# Patient Record
Sex: MALE | Race: BLACK OR AFRICAN AMERICAN | URBAN - METROPOLITAN AREA
[De-identification: names, ages, dates, MRNs, and addresses within clinical notes are randomized per-mention and may not be internally consistent; named-entity substitution may affect disease eponyms.]

---

## 2022-05-11 ENCOUNTER — OFFICE VISIT (OUTPATIENT)
Dept: URBAN - METROPOLITAN AREA CLINIC 92 | Facility: CLINIC | Age: 32
End: 2022-05-11
Payer: COMMERCIAL

## 2022-05-11 ENCOUNTER — DASHBOARD ENCOUNTERS (OUTPATIENT)
Age: 32
End: 2022-05-11

## 2022-05-11 ENCOUNTER — WEB ENCOUNTER (OUTPATIENT)
Dept: URBAN - METROPOLITAN AREA CLINIC 92 | Facility: CLINIC | Age: 32
End: 2022-05-11

## 2022-05-11 VITALS
HEIGHT: 70 IN | TEMPERATURE: 97.7 F | DIASTOLIC BLOOD PRESSURE: 69 MMHG | SYSTOLIC BLOOD PRESSURE: 132 MMHG | WEIGHT: 209 LBS | BODY MASS INDEX: 29.92 KG/M2 | HEART RATE: 81 BPM

## 2022-05-11 DIAGNOSIS — R07.9 CHEST PAIN, UNSPECIFIED TYPE: ICD-10-CM

## 2022-05-11 DIAGNOSIS — K21.9 GASTROESOPHAGEAL REFLUX DISEASE, UNSPECIFIED WHETHER ESOPHAGITIS PRESENT: ICD-10-CM

## 2022-05-11 DIAGNOSIS — R14.2 BELCHING: ICD-10-CM

## 2022-05-11 PROCEDURE — 99204 OFFICE O/P NEW MOD 45 MIN: CPT | Performed by: INTERNAL MEDICINE

## 2022-05-11 RX ORDER — OMEPRAZOLE 40 MG/1
1 CAPSULE 30 MINUTES BEFORE MORNING MEAL CAPSULE, DELAYED RELEASE ORAL ONCE A DAY
Qty: 30 | Refills: 6 | OUTPATIENT
Start: 2022-05-11

## 2022-05-11 NOTE — HPI-TODAY'S VISIT:
This is a 4-year 9-year-old male presents today for evaluation of reflux.  He notes that he has had heartburn for years.  Predominantly with chest pain when lying down.  He also has excessive burping throughout the day especially after meals and during workouts.  He went to the ER the other night for chest pain.  His evaluation was negative.  There is no nausea vomiting no weight loss.  He has tried over-the-counter preparations for acid reflux in the past with varying results.

## 2022-05-12 PROBLEM — 235595009: Status: ACTIVE | Noted: 2022-05-11

## 2022-05-13 ENCOUNTER — CLAIMS CREATED FROM THE CLAIM WINDOW (OUTPATIENT)
Dept: URBAN - METROPOLITAN AREA SURGERY CENTER 16 | Facility: SURGERY CENTER | Age: 32
End: 2022-05-13

## 2022-05-13 ENCOUNTER — CLAIMS CREATED FROM THE CLAIM WINDOW (OUTPATIENT)
Dept: URBAN - METROPOLITAN AREA SURGERY CENTER 16 | Facility: SURGERY CENTER | Age: 32
End: 2022-05-13
Payer: COMMERCIAL

## 2022-05-13 DIAGNOSIS — Z12.11 COLON CANCER SCREENING: ICD-10-CM

## 2022-05-13 PROCEDURE — 992 APS NON BILLABLE: Performed by: INTERNAL MEDICINE

## 2025-02-12 ENCOUNTER — OFFICE VISIT (OUTPATIENT)
Dept: URBAN - METROPOLITAN AREA CLINIC 92 | Facility: CLINIC | Age: 35
End: 2025-02-12
Payer: COMMERCIAL

## 2025-02-12 VITALS
HEIGHT: 70 IN | TEMPERATURE: 97.9 F | BODY MASS INDEX: 29.2 KG/M2 | SYSTOLIC BLOOD PRESSURE: 129 MMHG | HEART RATE: 110 BPM | WEIGHT: 204 LBS | DIASTOLIC BLOOD PRESSURE: 82 MMHG

## 2025-02-12 DIAGNOSIS — R13.10 ODYNOPHAGIA: ICD-10-CM

## 2025-02-12 DIAGNOSIS — K21.9 GERD WITHOUT ESOPHAGITIS: ICD-10-CM

## 2025-02-12 DIAGNOSIS — R14.2 BELCHING: ICD-10-CM

## 2025-02-12 PROBLEM — 30233002: Status: ACTIVE | Noted: 2025-02-12

## 2025-02-12 PROBLEM — 266435005: Status: ACTIVE | Noted: 2025-02-12

## 2025-02-12 PROCEDURE — 99213 OFFICE O/P EST LOW 20 MIN: CPT

## 2025-02-12 NOTE — PHYSICAL EXAM EYES:
Conjuntivae and eyelids appear normal,  Sclerae : White without injection
[FreeTextEntry1] : 46 yo male w/ pmh of +HPV w/ genital warts treated by dermatology.  Pt states that he has had a wart in the perianal area that has been treated in the past.  He presents for evaluation.  Denies pain or bleeding.  Colonoscopy performed yesterday showed only IH.  No family hx of colon cancer or IBD.  Normal BMs.  Denies straining.   Pt is a vegan.  Pt states he is heterosexual and denies anal receptive sex\par \par PMH as above\par PSH knee\par Meds denies\par Penicillin allergy\par

## 2025-02-12 NOTE — HPI-TODAY'S VISIT:
34-year-old male presents today for heartburn.  He saw Dr. Jones in May 2022 for heartburn to him several years.  Also had chest pain while laying down he was recommended to undergo an upper endoscopy however this was never done.   Today he notes he has had sx for 2 years. He mainly has belching. Worse after eating food. Unable to tell which types of food. Does drink a lot of carbonated beverages.  He has occasional heartburn and rare regurg. Has been on omeprazole on and off for several years but doesn't work. Was recently switched to pantoprazole 40mg which he has not started yet. He denies dysphagia but on occasion can have odynophagia. BM are usually regular once a day. Now qod and having loose to normal stool. This has been an issue for 3 weeks. He did use antifungal recently for thrush given by dentist. He uses NSAIDs every 2 weeks. No fam h/o gi cancers ot conditions. Had labs with PCP last week no results yet.

## 2025-03-28 ENCOUNTER — OFFICE VISIT (OUTPATIENT)
Dept: URBAN - METROPOLITAN AREA SURGERY CENTER 16 | Facility: SURGERY CENTER | Age: 35
End: 2025-03-28

## 2025-04-18 ENCOUNTER — OFFICE VISIT (OUTPATIENT)
Dept: URBAN - METROPOLITAN AREA CLINIC 92 | Facility: CLINIC | Age: 35
End: 2025-04-18

## 2025-06-25 ENCOUNTER — OFFICE VISIT (OUTPATIENT)
Dept: URBAN - METROPOLITAN AREA CLINIC 92 | Facility: CLINIC | Age: 35
End: 2025-06-25

## 2025-07-28 ENCOUNTER — OFFICE VISIT (OUTPATIENT)
Dept: URBAN - METROPOLITAN AREA SURGERY CENTER 16 | Facility: SURGERY CENTER | Age: 35
End: 2025-07-28

## 2025-08-26 ENCOUNTER — CLAIMS CREATED FROM THE CLAIM WINDOW (OUTPATIENT)
Dept: URBAN - METROPOLITAN AREA SURGERY CENTER 16 | Facility: SURGERY CENTER | Age: 35
End: 2025-08-26

## 2025-08-26 ENCOUNTER — CLAIMS CREATED FROM THE CLAIM WINDOW (OUTPATIENT)
Dept: URBAN - METROPOLITAN AREA CLINIC 4 | Facility: CLINIC | Age: 35
End: 2025-08-26
Payer: OTHER GOVERNMENT

## 2025-08-26 DIAGNOSIS — K29.60 OTHER GASTRITIS WITHOUT BLEEDING: ICD-10-CM

## 2025-08-26 DIAGNOSIS — B96.81 HELICOBACTER PYLORI [H. PYLORI] AS THE CAUSE OF DISEASES CLASSIFIED ELSEWHERE: ICD-10-CM

## 2025-08-26 PROCEDURE — 88342 IMHCHEM/IMCYTCHM 1ST ANTB: CPT | Performed by: PATHOLOGY

## 2025-08-26 PROCEDURE — 88305 TISSUE EXAM BY PATHOLOGIST: CPT | Performed by: PATHOLOGY

## 2025-08-26 PROCEDURE — 88341 IMHCHEM/IMCYTCHM EA ADD ANTB: CPT | Performed by: PATHOLOGY
